# Patient Record
Sex: MALE | Race: WHITE | URBAN - METROPOLITAN AREA
[De-identification: names, ages, dates, MRNs, and addresses within clinical notes are randomized per-mention and may not be internally consistent; named-entity substitution may affect disease eponyms.]

---

## 2019-01-22 ENCOUNTER — OFFICE VISIT (OUTPATIENT)
Dept: URBAN - METROPOLITAN AREA CLINIC 23 | Facility: CLINIC | Age: 74
End: 2019-01-22
Payer: MEDICARE

## 2019-01-22 PROCEDURE — 92014 COMPRE OPH EXAM EST PT 1/>: CPT | Performed by: OPTOMETRIST

## 2019-01-22 ASSESSMENT — VISUAL ACUITY
OD: 20/25
OS: 20/25

## 2019-01-22 ASSESSMENT — KERATOMETRY
OS: 45.15
OD: 45.13

## 2019-01-22 ASSESSMENT — INTRAOCULAR PRESSURE
OS: 20
OD: 19

## 2021-02-03 ENCOUNTER — OFFICE VISIT (OUTPATIENT)
Dept: URBAN - METROPOLITAN AREA CLINIC 23 | Facility: CLINIC | Age: 76
End: 2021-02-03
Payer: MEDICARE

## 2021-02-03 DIAGNOSIS — G51.0 BELL'S PALSY: ICD-10-CM

## 2021-02-03 DIAGNOSIS — H25.11 AGE-RELATED NUCLEAR CATARACT OF RIGHT EYE: ICD-10-CM

## 2021-02-03 PROCEDURE — 99214 OFFICE O/P EST MOD 30 MIN: CPT | Performed by: OPTOMETRIST

## 2021-02-03 ASSESSMENT — INTRAOCULAR PRESSURE
OD: 18
OS: 21

## 2021-02-03 NOTE — IMPRESSION/PLAN
Impression: Age-related nuclear cataract of right eye: H25.11. Plan: Cataracts account for the patient's complaints. No treatment currently recommended. The patient will monitor vision changes and contact us with any decrease in vision.

## 2022-02-16 ENCOUNTER — OFFICE VISIT (OUTPATIENT)
Dept: URBAN - METROPOLITAN AREA CLINIC 23 | Facility: CLINIC | Age: 77
End: 2022-02-16
Payer: MEDICARE

## 2022-02-16 DIAGNOSIS — H43.811 VITREOUS DEGENERATION, RIGHT EYE: ICD-10-CM

## 2022-02-16 DIAGNOSIS — H25.12 AGE-RELATED NUCLEAR CATARACT, LEFT EYE: ICD-10-CM

## 2022-02-16 DIAGNOSIS — H25.811 COMBINED FORMS OF AGE-RELATED CATARACT, RIGHT EYE: Primary | ICD-10-CM

## 2022-02-16 DIAGNOSIS — H40.023 OPEN ANGLE WITH BORDERLINE FINDINGS, HIGH RISK, BILATERAL: ICD-10-CM

## 2022-02-16 PROCEDURE — 99213 OFFICE O/P EST LOW 20 MIN: CPT | Performed by: OPTOMETRIST

## 2022-02-16 ASSESSMENT — VISUAL ACUITY
OD: 20/30
OS: 20/25

## 2022-02-16 ASSESSMENT — KERATOMETRY
OS: 45.25
OD: 45.50

## 2022-02-16 ASSESSMENT — INTRAOCULAR PRESSURE
OS: 21
OD: 22

## 2022-02-16 NOTE — IMPRESSION/PLAN
Impression: Age-related nuclear cataract, left eye: H25.12 Left. Condition: established, stable.  Plan: See plan 1

## 2022-02-16 NOTE — IMPRESSION/PLAN
Impression: Vitreous degeneration, right eye: H43.811 Right. Condition: established, stable. Plan: Discussed findings. Stable, no retinal pathology noted. Continue to monitor.

## 2022-02-16 NOTE — IMPRESSION/PLAN
Impression: Combined forms of age-related cataract, right eye: H25.811 Right. Condition: established, stable. Plan: Discussed findings. Cataracts small, no treatment necessary at this time. Continue to monitor, call with any vision changes.

## 2022-02-16 NOTE — IMPRESSION/PLAN
Impression: Open angle with borderline findings, high risk, bilateral: H40.023 Bilateral. Condition: will continue to monitor. Plan: Discussed findings. IOP appears elevated but ONH appears healthy. Patient reports family history of glaucoma - mother. Continue to monitor, call with vision changes.

## 2023-01-31 ENCOUNTER — OFFICE VISIT (OUTPATIENT)
Dept: URBAN - METROPOLITAN AREA CLINIC 23 | Facility: CLINIC | Age: 78
End: 2023-01-31
Payer: MEDICARE

## 2023-01-31 DIAGNOSIS — H40.023 OPEN ANGLE WITH BORDERLINE FINDINGS, HIGH RISK, BILATERAL: ICD-10-CM

## 2023-01-31 DIAGNOSIS — H25.12 AGE-RELATED NUCLEAR CATARACT, LEFT EYE: ICD-10-CM

## 2023-01-31 DIAGNOSIS — H25.811 COMBINED FORMS OF AGE-RELATED CATARACT, RIGHT EYE: Primary | ICD-10-CM

## 2023-01-31 PROCEDURE — 92133 CPTRZD OPH DX IMG PST SGM ON: CPT | Performed by: OPTOMETRIST

## 2023-01-31 PROCEDURE — 99213 OFFICE O/P EST LOW 20 MIN: CPT | Performed by: OPTOMETRIST

## 2023-01-31 ASSESSMENT — VISUAL ACUITY
OD: 20/20
OS: 20/20

## 2023-01-31 ASSESSMENT — KERATOMETRY
OS: 45.38
OD: 45.50

## 2023-01-31 ASSESSMENT — INTRAOCULAR PRESSURE
OD: 23
OS: 22

## 2023-01-31 NOTE — IMPRESSION/PLAN
Impression: Open angle with borderline findings, high risk, bilateral: H40.023. Bilateral. Condition: established, stable. Plan: Discussed findings. Ordered and reviewed baseline RNFL OCT. No signs of glaucomatous damage at this time. Monitor closely due to elevated IOP.

## 2023-01-31 NOTE — IMPRESSION/PLAN
Impression: Age-related nuclear cataract, left eye: H25.12 Left. Condition: established, stable.  Plan: See above plan

## 2023-01-31 NOTE — IMPRESSION/PLAN
Impression: Combined forms of age-related cataract, right eye: H25.811 Right. Condition: established, stable. Plan: Discussed findings. Mild cataract, no treatment necessary. Reprinted last glasses rx, no change. Monitor annually, sooner with any vision changes.